# Patient Record
Sex: FEMALE | Race: WHITE | NOT HISPANIC OR LATINO | Employment: OTHER | URBAN - METROPOLITAN AREA
[De-identification: names, ages, dates, MRNs, and addresses within clinical notes are randomized per-mention and may not be internally consistent; named-entity substitution may affect disease eponyms.]

---

## 2023-10-04 ENCOUNTER — TELEPHONE (OUTPATIENT)
Dept: ORTHOPEDIC SURGERY | Facility: CLINIC | Age: 68
End: 2023-10-04
Payer: MEDICARE

## 2023-10-04 NOTE — TELEPHONE ENCOUNTER
Provider: ABDULKADIR HUESTON     Caller: CANDI PIÑA     Relationship to Patient:      Phone Number: [phone]    Reason for Call: PATIENTS  CALLED TO STATE THAT WHEN PATIENT GOT HER LAST INJECTION ON 7/5/23 IN HER RIGHT SHOULDER IT ONLY HELPED FOR 1 WEEK AND THEN THE PAIN HAS BEEN BACK.   THEY ARE WANTING TO KNOW IF AN MRI NEEDS TO BE ORDERED OR IF ABDULKADIR RECOMMENDS ANOTHER INJECTION.   PATIENT LIVES IN [place] AND WOULD LIKE SOMETHING CLOSER TO THERE IF AN MRI IS NEEDED.   PLEASE ADVISE ADVISE WIFE [phone]    When was the patient last seen: 7/5/23

## 2023-10-05 ENCOUNTER — OFFICE VISIT (OUTPATIENT)
Age: 68
End: 2023-10-05
Payer: MEDICARE

## 2023-10-05 VITALS — HEIGHT: 61 IN | WEIGHT: 201.9 LBS | BODY MASS INDEX: 38.12 KG/M2

## 2023-10-05 DIAGNOSIS — G89.29 CHRONIC RIGHT SHOULDER PAIN: Primary | ICD-10-CM

## 2023-10-05 DIAGNOSIS — M25.511 CHRONIC RIGHT SHOULDER PAIN: Primary | ICD-10-CM

## 2023-10-05 PROCEDURE — 99213 OFFICE O/P EST LOW 20 MIN: CPT | Performed by: NURSE PRACTITIONER

## 2023-10-05 RX ORDER — PRAVASTATIN SODIUM 20 MG
TABLET ORAL
COMMUNITY
Start: 2023-08-09

## 2023-10-05 RX ORDER — ACETAMINOPHEN 160 MG
TABLET,DISINTEGRATING ORAL
COMMUNITY
End: 2023-10-05

## 2023-11-06 ENCOUNTER — TELEPHONE (OUTPATIENT)
Dept: ORTHOPEDIC SURGERY | Facility: CLINIC | Age: 68
End: 2023-11-06
Payer: MEDICARE

## 2023-11-06 NOTE — TELEPHONE ENCOUNTER
INS. DENIED MRI SHOULDER ARTHROGRAM.  PEER TO PEER IS AVAILABLE.  PLEASE RETURN CALL [phone] WITH A PEER TO PEER APPROVAL OR AN APPROVAL TO RESCHEDULE/CANCEL THE EXAM UNTIL APPROVAL IS RECEIVED FROM INSURANCE.

## 2023-11-08 ENCOUNTER — TELEPHONE (OUTPATIENT)
Dept: ORTHOPEDIC SURGERY | Facility: CLINIC | Age: 68
End: 2023-11-08

## 2023-11-08 NOTE — TELEPHONE ENCOUNTER
"Please review pt call.   Per scheduling note on referral from 11/6/23: \"11/6 PER EMAIL AUTH HAS BEEN DENIED AND [facility] WILL NEED TO DO A P2P *PT WANTS TO R/S ONLY AFTER THE AUTH HAS BEEN OBTAINED\""

## 2023-11-08 NOTE — TELEPHONE ENCOUNTER
Provider: HUESTON    Caller: PATIENT    Phone Number: [phone]    Reason for Call: PATIENT IS ASKING FOR AN UPDATE ON AUTH FOR HER MRI. SHE ALSO STATES THAT HER RIGHT SHOULDER SEEMS TO BE FEELING BETTER AT THIS TIME. PLEASE CALL HER TO DISCUSS.

## 2023-11-20 ENCOUNTER — TELEPHONE (OUTPATIENT)
Dept: ORTHOPEDIC SURGERY | Facility: CLINIC | Age: 68
End: 2023-11-20
Payer: MEDICARE

## 2023-11-20 DIAGNOSIS — G89.29 CHRONIC RIGHT SHOULDER PAIN: Primary | ICD-10-CM

## 2023-11-20 DIAGNOSIS — M25.511 CHRONIC RIGHT SHOULDER PAIN: Primary | ICD-10-CM

## 2023-11-20 NOTE — TELEPHONE ENCOUNTER
[phone]

## 2023-11-20 NOTE — TELEPHONE ENCOUNTER
I informed Ms. Ley her MR arthrogram was denied by insurance.  I recommended she try conservative treatment with physical therapy for now.  After approximately 6 weeks of therapy we can revisit getting the MR arthrogram approved.  She agreed.  She would like to attend therapy at [facility].  I will fax the referral, but she will need to contact them to schedule.  She verbalized understanding and appreciated the assistance.

## 2025-07-17 ENCOUNTER — OFFICE VISIT (OUTPATIENT)
Age: 70
End: 2025-07-17
Payer: MEDICARE

## 2025-07-17 VITALS — BODY MASS INDEX: 37.98 KG/M2 | TEMPERATURE: 98 F | WEIGHT: 201 LBS

## 2025-07-17 DIAGNOSIS — M25.511 CHRONIC RIGHT SHOULDER PAIN: Primary | ICD-10-CM

## 2025-07-17 DIAGNOSIS — G89.29 CHRONIC RIGHT SHOULDER PAIN: Primary | ICD-10-CM

## 2025-07-17 RX ORDER — EZETIMIBE 10 MG/1
10 TABLET ORAL DAILY
COMMUNITY
Start: 2025-05-27

## 2025-07-17 RX ORDER — VIBEGRON 75 MG/1
75 TABLET, FILM COATED ORAL DAILY
COMMUNITY
Start: 2025-02-06

## 2025-07-17 RX ADMIN — METHYLPREDNISOLONE ACETATE 80 MG: 80 INJECTION, SUSPENSION INTRA-ARTICULAR; INTRALESIONAL; INTRAMUSCULAR; SOFT TISSUE at 09:45

## 2025-07-17 RX ADMIN — LIDOCAINE HYDROCHLORIDE 2 ML: 10 INJECTION, SOLUTION EPIDURAL; INFILTRATION; INTRACAUDAL; PERINEURAL at 09:45

## 2025-07-17 NOTE — PROGRESS NOTES
Chief Complaint:  Follow up right shoulder     HPI:  Ms. Ley comes in today for right shoulder follow up.  I last saw her for this problem in October 2023.  Reports she fell on June 29 while on a cruise ship to [facility].  Reports she was in the bathroom and fell into the shower area.  She cannot recall the position of her arm as she fell.  Reports she did not seek medical attention while on the ship.  Prior to the fall, she experienced intermittent mild to moderate pain in the shoulder.  Since the incident, she has experienced moderate to severe, intermittent, and aching pain with certain reaching and lifting motions.  She has mild pain at rest. She has used a lidocaine cream which has helped somewhat.  Denies numbness or tingling in the arm or hand.    Vitals:    07/17/25 0901   Temp: 98 °F (36.7 °C)   Weight: 91.2 kg (201 lb)       Exam: Right upper extremity:  Skin is benign.  No gross abnormalities on inspection including any atrophy, swellings, or masses.  No palpable masses or adenopathy.  No focal areas of significant tenderness.  Full shoulder motion.  No evident instability or apprehension.  Positive Empty Can, Sanabria maneuvers.  Positive Bear Hug test.  Positive Lavinia's.  4 out of 5 strength with resisted forward elevation in scapular plane and good external rotation with arm at her side.  Good strength of the deltoid, biceps, triceps, and .  Intact sensation throughout the arm.  Brisk capillary refill.  Palpable radial pulse.      Imaging: AP, scapular Y, and axillary views of the right shoulder are ordered by myself and reviewed to evaluate the patient's complaint.  These are compared to previous x-rays.  The x-rays show no obvious acute abnormalities, lesions, masses, significant degenerative changes, or other concerning findings.  The acromiohumeral interval is normal.    Assessment:   Chronic right shoulder pain, suspected rotator cuff tear    Plan: We discussed the natural history of  rotator cuff tears and risk of potential tear progression.  We thoroughly discussed options in detail including a trial of conservative treatment versus further workup and potential surgical options.  She has acknowledged understanding of this information the patient would prefer to pursue nonsurgical management at this time.  The risks, benefits and alternatives were discussed, particularly elevated risk with respect to her diabetes.  She consented and the injection was performed as noted below.  I offered to refer her for physical therapy, but she declined.  She states she has significant difficulty performing physical therapy exercises due to balance issues.  Going forward, she may follow-up with me as needed.      Kristen Gongora, APRN    07/17/2025      Large Joint Arthrocentesis: R subacromial bursa  Date/Time: 7/17/2025 9:45 AM  Consent given by: patient  Site marked: site marked  Timeout: Immediately prior to procedure a time out was called to verify the correct patient, procedure, equipment, support staff and site/side marked as required   Supporting Documentation  Indications: pain and joint swelling   Procedure Details  Location: shoulder - R subacromial bursa  Preparation: Patient was prepped and draped in the usual sterile fashion  Needle gauge: 21G.  Approach: posterior  Medications administered: 80 mg methylPREDNISolone acetate 80 MG/ML; 2 mL lidocaine PF 1% 1 %  Patient tolerance: patient tolerated the procedure well with no immediate complications

## 2025-07-18 RX ORDER — METHYLPREDNISOLONE ACETATE 80 MG/ML
80 INJECTION, SUSPENSION INTRA-ARTICULAR; INTRALESIONAL; INTRAMUSCULAR; SOFT TISSUE
Status: COMPLETED | OUTPATIENT
Start: 2025-07-17 | End: 2025-07-17

## 2025-07-18 RX ORDER — LIDOCAINE HYDROCHLORIDE 10 MG/ML
2 INJECTION, SOLUTION EPIDURAL; INFILTRATION; INTRACAUDAL; PERINEURAL
Status: COMPLETED | OUTPATIENT
Start: 2025-07-17 | End: 2025-07-17